# Patient Record
Sex: FEMALE | Race: WHITE | NOT HISPANIC OR LATINO | URBAN - METROPOLITAN AREA
[De-identification: names, ages, dates, MRNs, and addresses within clinical notes are randomized per-mention and may not be internally consistent; named-entity substitution may affect disease eponyms.]

---

## 2017-05-31 ENCOUNTER — OUTPATIENT (OUTPATIENT)
Dept: OUTPATIENT SERVICES | Facility: HOSPITAL | Age: 32
LOS: 1 days | Discharge: HOME | End: 2017-05-31

## 2017-06-28 DIAGNOSIS — M54.2 CERVICALGIA: ICD-10-CM

## 2024-05-24 ENCOUNTER — APPOINTMENT (OUTPATIENT)
Dept: DERMATOLOGY | Facility: CLINIC | Age: 39
End: 2024-05-24
Payer: COMMERCIAL

## 2024-05-24 VITALS — HEIGHT: 65 IN | BODY MASS INDEX: 24.16 KG/M2 | WEIGHT: 145 LBS

## 2024-05-24 DIAGNOSIS — L30.9 DERMATITIS, UNSPECIFIED: ICD-10-CM

## 2024-05-24 DIAGNOSIS — H01.009 UNSPECIFIED BLEPHARITIS UNSPECIFIED EYE, UNSPECIFIED EYELID: ICD-10-CM

## 2024-05-24 DIAGNOSIS — L40.8 OTHER PSORIASIS: ICD-10-CM

## 2024-05-24 PROBLEM — Z00.00 ENCOUNTER FOR PREVENTIVE HEALTH EXAMINATION: Status: ACTIVE | Noted: 2024-05-24

## 2024-05-24 PROCEDURE — 99203 OFFICE O/P NEW LOW 30 MIN: CPT

## 2024-05-24 RX ORDER — KETOCONAZOLE 20.5 MG/ML
2 SHAMPOO, SUSPENSION TOPICAL
Qty: 1 | Refills: 11 | Status: ACTIVE | COMMUNITY
Start: 2024-05-24 | End: 1900-01-01

## 2024-05-24 RX ORDER — TACROLIMUS 1 MG/G
0.1 OINTMENT TOPICAL
Qty: 1 | Refills: 2 | Status: ACTIVE | COMMUNITY
Start: 2024-05-24 | End: 1900-01-01

## 2024-05-24 RX ORDER — FLUOCINONIDE 0.5 MG/ML
0.05 SOLUTION TOPICAL
Qty: 1 | Refills: 4 | Status: ACTIVE | COMMUNITY
Start: 2024-05-24 | End: 1900-01-01

## 2024-05-24 NOTE — HISTORY OF PRESENT ILLNESS
[FreeTextEntry1] : psoriasis, dermatitis-RPA [de-identified] : Psoriasis location: scalp duration: months Dr Cavanaugh prescribed ketoconazole shampoo and fluocinonide, minor improvement.   Dermatitis location: eyelids duration: months Dr Cavanaugh prescribed Pimecrolimus cream, no improvement noticed.

## 2024-05-24 NOTE — ASSESSMENT
[FreeTextEntry1] : 1) Sebopsoriasis, scalp, chronic/stable - Reviewed risks (as well as mitigation strategies for adverse drug events as applicable), benefits, and alternatives of therapy  - Cont fluocinonide soln and ketoconazole shampoo  2) Dermatitis, eyelids - favor blepharitis (in the setting of underlying rosacea), though ACD is a possibility. atopic dermatitis may be considered as well - Recommended ophthalmology consultation. patient also needs to an ophthalmologist for reassessment/treatment of vision  - May also consider patch testing with AI in the future - Will trial tacrolimus for the eyelid erythema. pimecrolimus caused eye burning 2/2 running of the cream into the eyes   RTC 2-3 months or prn

## 2024-05-24 NOTE — PHYSICAL EXAM
[FreeTextEntry3] : erythema and edema of left upper eyelid Pink erythematous patches with thicker yellowish scale on the scalp. few well-demarcated plaques centrofacial erythema and telangiectasias